# Patient Record
Sex: FEMALE | URBAN - METROPOLITAN AREA
[De-identification: names, ages, dates, MRNs, and addresses within clinical notes are randomized per-mention and may not be internally consistent; named-entity substitution may affect disease eponyms.]

---

## 2017-11-08 ENCOUNTER — APPOINTMENT (RX ONLY)
Dept: URBAN - METROPOLITAN AREA CLINIC 15 | Facility: CLINIC | Age: 52
Setting detail: DERMATOLOGY
End: 2017-11-08

## 2017-11-08 DIAGNOSIS — Z41.9 ENCOUNTER FOR PROCEDURE FOR PURPOSES OTHER THAN REMEDYING HEALTH STATE, UNSPECIFIED: ICD-10-CM

## 2017-11-08 PROBLEM — F41.9 ANXIETY DISORDER, UNSPECIFIED: Status: ACTIVE | Noted: 2017-11-08

## 2017-11-08 PROCEDURE — ? RECOMMENDATIONS

## 2017-11-08 PROCEDURE — ? ADDITIONAL NOTES

## 2017-11-08 PROCEDURE — ? MEDICAL CONSULTATION: DYNAMIC RHYTIDES

## 2017-11-08 PROCEDURE — ? MEDICAL CONSULTATION: FILLERS

## 2017-11-08 NOTE — PROCEDURE: ADDITIONAL NOTES
Additional Notes: Patient is getting  in 2 weeks. \\nIt was explained that she will need Botox AND fillers to improve the glabellar wrinkle for her wedding. \\n
Additional Notes: PRP rejuvenation \\nPatient was interested on the PRP procedure for skin rejuvenation. \\n\\nPatient understands that the PRP is injected into specific areas of the skin, acts as a matrix that promotes your own collagen to grow, regenerates tissue, and thus acts as to naturally smooth and tighten the skin. In this way, PRP softens wrinkles and creating smoother skin texture and tone. \\n\\nThe number of treatments needed depends on each individual patient and desired outcome. It is recommended a series of one to three injection sessions spaced from four to six weeks apart for optimal results. \\n\\nWe discussed the risks and benefits of the procedure including but not limited to redness, bruising, inflammation, procedural pain, scarring infection, superficial bleeding, crusting and scabbing and incomplete improvement. Patient is aware of post - operative pain swelling and redness. \\n\\Chelsie patient's questions were answered fully and to patient satisfaction. Patient understands that the treatment is cosmetic in nature and not covered by insurance. \\n\\n